# Patient Record
Sex: FEMALE
[De-identification: names, ages, dates, MRNs, and addresses within clinical notes are randomized per-mention and may not be internally consistent; named-entity substitution may affect disease eponyms.]

---

## 2021-08-09 ENCOUNTER — APPOINTMENT (OUTPATIENT)
Dept: OBGYN | Facility: CLINIC | Age: 21
End: 2021-08-09
Payer: COMMERCIAL

## 2021-08-19 ENCOUNTER — APPOINTMENT (OUTPATIENT)
Dept: OBGYN | Facility: CLINIC | Age: 21
End: 2021-08-19
Payer: COMMERCIAL

## 2021-08-19 VITALS
WEIGHT: 126 LBS | HEIGHT: 60 IN | BODY MASS INDEX: 24.74 KG/M2 | DIASTOLIC BLOOD PRESSURE: 70 MMHG | SYSTOLIC BLOOD PRESSURE: 100 MMHG

## 2021-08-19 DIAGNOSIS — N64.4 MASTODYNIA: ICD-10-CM

## 2021-08-19 DIAGNOSIS — Z78.9 OTHER SPECIFIED HEALTH STATUS: ICD-10-CM

## 2021-08-19 PROCEDURE — 87081 CULTURE SCREEN ONLY: CPT

## 2021-08-19 PROCEDURE — 99395 PREV VISIT EST AGE 18-39: CPT
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
## 2021-08-19 NOTE — HISTORY OF PRESENT ILLNESS
[Normal Amount/Duration] :  normal amount and duration [Regular Cycle Intervals] : periods have been regular [Menarche Age: ____] : age at menarche was [unfilled] [Currently Active] : currently active [Men] : men [Vaginal] : vaginal [Oral] : oral [Yes] : Yes [Condoms] : Condoms [Patient refuses STI testing] : Patient refuses STI testing [FreeTextEntry1] : DERRICK VASQUEZ 20 year, G0, LMP: 21, no PMH, presents for her annual GYN exam. \par She denies abdominal and pelvic pain. No vaginal discharge or vaginitis symptoms. No urinary complaints. BM is normal per patient.\par \par Pt c/o breast discomfort for the past 5 years which she describes as a sharp, pulling pain on R side of breast\par Pt states she uses push up bras with underwire for 12h/day and drinks 1 can of Pepsi a day. SHe also drinks green tea frequently. She states she is unsure if the pain is associated with her menstrual cycle, but she has noted the R breast seemed more "full and dense" during menses.\par \par Obhx: G0\par GYNhx: denies\par PMH: denies\par PSH: denies\par Med: denies\par All: dust, pollen, ragwood, animal dander\par Soc: rare etoh, denies T/D\par Psych: anxiety/depression? to be worked up\par Famhx: denies\par \par Health maintenance:\par Last STI Screenin\par Last mammogram: at 15 yo,fibrocystic breasts\par Immunizations (flu, COVID, Gardasil): UTD\par

## 2021-08-19 NOTE — DISCUSSION/SUMMARY
[FreeTextEntry1] : #HCM\par -Breast self exam reviewed and taught\par -STI Screening today with GC/CT cultures, pt will get HIV, RPR, Hep B/C at outside lab\par -Immunizations: UTD\par \par #depression/anxiety\par -pt feels well today but would like to see someone for w/u\par -referral to psychiatrists given\par \par #R mastalgia \par -R breast US rx'd. Fibrocystic breasts noted b/l\par -counseled pt to decrease caffeine intake\par -counseled pt to avoid bras with underwire. recommend sports bras\par -Tylenol/Motrin for pain PRN\par \par RTO in 1 year for annual GYN exam or PRN for any GYN complaints\par ALLISON Salter MD\par

## 2021-08-20 ENCOUNTER — TRANSCRIPTION ENCOUNTER (OUTPATIENT)
Age: 21
End: 2021-08-20

## 2021-08-20 LAB
C TRACH RRNA SPEC QL NAA+PROBE: NOT DETECTED
N GONORRHOEA RRNA SPEC QL NAA+PROBE: NOT DETECTED
SOURCE AMPLIFICATION: NORMAL

## 2021-10-01 ENCOUNTER — NON-APPOINTMENT (OUTPATIENT)
Age: 21
End: 2021-10-01

## 2023-11-10 ENCOUNTER — APPOINTMENT (OUTPATIENT)
Dept: OBGYN | Facility: CLINIC | Age: 23
End: 2023-11-10

## 2024-08-14 ENCOUNTER — APPOINTMENT (OUTPATIENT)
Dept: OBGYN | Facility: CLINIC | Age: 24
End: 2024-08-14